# Patient Record
Sex: MALE | Race: BLACK OR AFRICAN AMERICAN
[De-identification: names, ages, dates, MRNs, and addresses within clinical notes are randomized per-mention and may not be internally consistent; named-entity substitution may affect disease eponyms.]

---

## 2019-12-16 ENCOUNTER — HOSPITAL ENCOUNTER (OUTPATIENT)
Dept: HOSPITAL 72 - PAN | Age: 69
Discharge: HOME | End: 2019-12-16
Payer: MEDICARE

## 2019-12-16 DIAGNOSIS — Z85.038: ICD-10-CM

## 2019-12-16 DIAGNOSIS — F17.210: ICD-10-CM

## 2019-12-16 DIAGNOSIS — C18.9: Primary | ICD-10-CM

## 2019-12-17 NOTE — CONSULTATION
DATE OF CONSULTATION:  12/17/2019

GASTROENTEROLOGY CONSULTATION



CONSULTING PHYSICIAN:  Glen Dan M.D.



CHIEF COMPLAINT:  Referral for colon cancer followup.



HISTORY OF PRESENT ILLNESS:  This is a very pleasant 69-year-old 

American male with past medical history of colon cancer diagnosed _____

and the patient is here for one-year followup for colon cancer.



PAST MEDICAL HISTORY:  Other than colon cancer, none.



PAST SURGICAL HISTORY:  Partial colectomy.



MEDICATIONS:  None.



ALLERGIES:  None.



FAMILY HISTORY:  No family history of GI malignancies.



SOCIAL HISTORY:  The patient denies any alcohol use, but he smokes about

half a pack per day.



REVIEW OF SYSTEMS:  A 10-point review of systems was performed and

pertinent positives in HPI.



PHYSICAL EXAMINATION:

GENERAL:  This is a well-developed male, in no acute distress.

HEENT:  Normocephalic, atraumatic.  Sclerae anicteric.

NECK:  Supple.  No evidence of obvious lymphadenopathy.

CARDIOVASCULAR:  Regular rate, rhythm.  Plus S1, S2.  No obvious murmur.

LUNGS:  Clear to auscultation bilaterally.

ABDOMEN:  Soft and nontender.  No rebound.  No guarding.  No peritoneal

sign.

EXTREMITIES:  No cyanosis.  No clubbing.  No edema.



ASSESSMENT:  This is a 69-year-old male with past medical history of colon

cancer _____ about a year ago, this followup annual exam.  The patient was

given instruction for colonoscopy.  Risks and benefits of the procedure

was explained to him.  Prep was given to him.  The patient will be

scheduled for colonoscopy.









  ______________________________________________

  Glen Dan M.D.





DR:  REGLA

D:  12/17/2019 10:13

T:  12/17/2019 18:53

JOB#:  [id number]

CC:

## 2020-02-06 ENCOUNTER — HOSPITAL ENCOUNTER (OUTPATIENT)
Dept: HOSPITAL 72 - PAN | Age: 70
Discharge: HOME | End: 2020-02-06
Payer: MEDICARE

## 2020-02-06 DIAGNOSIS — K63.5: Primary | ICD-10-CM

## 2020-02-06 DIAGNOSIS — K64.8: ICD-10-CM

## 2020-02-06 PROCEDURE — 99212 OFFICE O/P EST SF 10 MIN: CPT

## 2020-02-06 NOTE — GENERAL PROGRESS NOTE
Assessment/Plan


Assessment/Plan:





SUMMARY OF FINDINGS:


1. fair colonoscopy prep.


2. Total of three colonic polyps removed, see above for details.


3. Internal hemorrhoids.





RECOMMENDATIONS:


1. path reviewed.


2. We recommend repeat colonoscopy in two years with a better prep.





Subjective


ROS Limited/Unobtainable:  Yes


Allergies:  


Coded Allergies:  


     No Known Allergies (Unverified , 12/17/19)





Objective


General Appearance:  alert


EENT:  normal ENT inspection


Neck:  supple


Cardiovascular:  normal rate


Respiratory/Chest:  decreased breath sounds


Abdomen:  normal bowel sounds, non tender, soft


Extremities:  non-tender











Glen Dan MD Feb 6, 2020 15:03